# Patient Record
Sex: MALE
[De-identification: names, ages, dates, MRNs, and addresses within clinical notes are randomized per-mention and may not be internally consistent; named-entity substitution may affect disease eponyms.]

---

## 2024-05-28 ENCOUNTER — DASHBOARD ENCOUNTERS (OUTPATIENT)
Age: 34
End: 2024-05-28

## 2024-05-28 ENCOUNTER — TELEPHONE ENCOUNTER (OUTPATIENT)
Dept: URBAN - METROPOLITAN AREA CLINIC 98 | Facility: CLINIC | Age: 34
End: 2024-05-28

## 2024-05-28 ENCOUNTER — LAB OUTSIDE AN ENCOUNTER (OUTPATIENT)
Dept: URBAN - METROPOLITAN AREA CLINIC 98 | Facility: CLINIC | Age: 34
End: 2024-05-28

## 2024-05-28 ENCOUNTER — OFFICE VISIT (OUTPATIENT)
Dept: URBAN - METROPOLITAN AREA CLINIC 98 | Facility: CLINIC | Age: 34
End: 2024-05-28

## 2024-05-28 VITALS
HEART RATE: 82 BPM | TEMPERATURE: 97.1 F | BODY MASS INDEX: 30.87 KG/M2 | SYSTOLIC BLOOD PRESSURE: 131 MMHG | DIASTOLIC BLOOD PRESSURE: 88 MMHG | WEIGHT: 180.8 LBS | HEIGHT: 64 IN

## 2024-06-02 ENCOUNTER — LAB OUTSIDE AN ENCOUNTER (OUTPATIENT)
Dept: URBAN - METROPOLITAN AREA CLINIC 98 | Facility: CLINIC | Age: 34
End: 2024-06-02

## 2024-06-02 PROBLEM — 722596001: Status: ACTIVE | Noted: 2024-06-02

## 2024-06-02 PROBLEM — 162864005: Status: ACTIVE | Noted: 2024-06-02

## 2024-07-16 ENCOUNTER — OFFICE VISIT (OUTPATIENT)
Dept: URBAN - METROPOLITAN AREA CLINIC 98 | Facility: CLINIC | Age: 34
End: 2024-07-16
Payer: COMMERCIAL

## 2024-07-16 VITALS
SYSTOLIC BLOOD PRESSURE: 120 MMHG | HEIGHT: 64 IN | WEIGHT: 175 LBS | HEART RATE: 71 BPM | BODY MASS INDEX: 29.88 KG/M2 | TEMPERATURE: 97.1 F | DIASTOLIC BLOOD PRESSURE: 76 MMHG

## 2024-07-16 DIAGNOSIS — R14.0 ABDOMINAL BLOATING: ICD-10-CM

## 2024-07-16 DIAGNOSIS — E66.8 EXTREME OBESITY: ICD-10-CM

## 2024-07-16 PROCEDURE — 99214 OFFICE O/P EST MOD 30 MIN: CPT | Performed by: INTERNAL MEDICINE

## 2024-07-16 NOTE — HPI-TODAY'S VISIT:
34 yo pt w p-prandial epigastric - upper abdominal fullness, distention, here for follow-up. Less sxs while on a bland diet. Has started a keto diet as well. Denies p-prandial dyspepsia. RUQ-US done, results are not available as yet. Labs 5/24: ALT 49, normal rest of CMP, CBC and negative UBT. No N/V / dysphagia / odynophagia / HB / regurgitation / cardiorespiratory sxs and no anorexia or weight loss. Normal bm's wo bleeding nor melenic stools. On no Rx. Normal CPE 2/24. No other complaints

## 2025-01-14 ENCOUNTER — OFFICE VISIT (OUTPATIENT)
Dept: URBAN - METROPOLITAN AREA CLINIC 98 | Facility: CLINIC | Age: 35
End: 2025-01-14